# Patient Record
Sex: FEMALE | URBAN - METROPOLITAN AREA
[De-identification: names, ages, dates, MRNs, and addresses within clinical notes are randomized per-mention and may not be internally consistent; named-entity substitution may affect disease eponyms.]

---

## 2018-06-05 ENCOUNTER — THERAPY VISIT (OUTPATIENT)
Dept: PHYSICAL THERAPY | Facility: CLINIC | Age: 83
End: 2018-06-05
Payer: MEDICARE

## 2018-06-05 DIAGNOSIS — M25.562 ACUTE PAIN OF LEFT KNEE: Primary | ICD-10-CM

## 2018-06-05 PROCEDURE — 97162 PT EVAL MOD COMPLEX 30 MIN: CPT | Mod: GP | Performed by: PHYSICAL THERAPIST

## 2018-06-05 PROCEDURE — G8979 MOBILITY GOAL STATUS: HCPCS | Mod: GP | Performed by: PHYSICAL THERAPIST

## 2018-06-05 PROCEDURE — 97110 THERAPEUTIC EXERCISES: CPT | Mod: GP | Performed by: PHYSICAL THERAPIST

## 2018-06-05 PROCEDURE — G8978 MOBILITY CURRENT STATUS: HCPCS | Mod: GP | Performed by: PHYSICAL THERAPIST

## 2018-06-05 ASSESSMENT — ACTIVITIES OF DAILY LIVING (ADL)
RISE FROM A CHAIR: ACTIVITY IS FAIRLY DIFFICULT
AS_A_RESULT_OF_YOUR_KNEE_INJURY,_HOW_WOULD_YOU_RATE_YOUR_CURRENT_LEVEL_OF_DAILY_ACTIVITY?: NEARLY NORMAL
SWELLING: I HAVE THE SYMPTOM BUT IT DOES NOT AFFECT MY ACTIVITY
LIMPING: I HAVE THE SYMPTOM BUT IT DOES NOT AFFECT MY ACTIVITY
WEAKNESS: THE SYMPTOM AFFECTS MY ACTIVITY SLIGHTLY
SQUAT: ACTIVITY IS FAIRLY DIFFICULT
GO UP STAIRS: ACTIVITY IS VERY DIFFICULT
PAIN: THE SYMPTOM AFFECTS MY ACTIVITY MODERATELY
WALK: ACTIVITY IS SOMEWHAT DIFFICULT
STIFFNESS: THE SYMPTOM AFFECTS MY ACTIVITY MODERATELY
KNEE_ACTIVITY_OF_DAILY_LIVING_SUM: 39
KNEE_ACTIVITY_OF_DAILY_LIVING_SCORE: 55.71
GO DOWN STAIRS: ACTIVITY IS FAIRLY DIFFICULT
KNEEL ON THE FRONT OF YOUR KNEE: ACTIVITY IS VERY DIFFICULT
HOW_WOULD_YOU_RATE_THE_CURRENT_FUNCTION_OF_YOUR_KNEE_DURING_YOUR_USUAL_DAILY_ACTIVITIES_ON_A_SCALE_FROM_0_TO_100_WITH_100_BEING_YOUR_LEVEL_OF_KNEE_FUNCTION_PRIOR_TO_YOUR_INJURY_AND_0_BEING_THE_INABILITY_TO_PERFORM_ANY_OF_YOUR_USUAL_DAILY_ACTIVITIES?: 80
STAND: ACTIVITY IS SOMEWHAT DIFFICULT
GIVING WAY, BUCKLING OR SHIFTING OF KNEE: I DO NOT HAVE THE SYMPTOM
RAW_SCORE: 39
SIT WITH YOUR KNEE BENT: ACTIVITY IS NOT DIFFICULT
HOW_WOULD_YOU_RATE_THE_OVERALL_FUNCTION_OF_YOUR_KNEE_DURING_YOUR_USUAL_DAILY_ACTIVITIES?: NEARLY NORMAL

## 2018-06-05 NOTE — LETTER
DEPARTMENT OF HEALTH AND HUMAN SERVICES  CENTERS FOR MEDICARE & MEDICAID SERVICES    PLAN/UPDATED PLAN OF PROGRESS FOR OUTPATIENT REHABILITATION    PATIENTS NAME:  Caitie Law   : 1935  PROVIDER NUMBER:    [id number]  HICN: [id number]   PROVIDER NAME: [place] [facility] PHYSICAL THERAPY  MEDICAL RECORD NUMBER: [id number]   START OF CARE DATE:  SOC Date: 18   TYPE:  PT    PRIMARY/TREATMENT DIAGNOSIS: (Pertinent Medical Diagnosis)  Acute pain of left knee    VISITS FROM START OF CARE:  Rxs Used: 1     [place] [facility] Initial Evaluation  Subjective:  Patient is a 83 year old female presenting with rehab left knee hpi. The history is provided by the patient. No  was used.   Julia Law is a 83 year old female with a left knee condition.  Condition occurred with:  A twist.  Condition occurred: at home.  This is a new condition  Left knee pain that goes up into her hip and rib cage.  This started on 18.   She picked up an empty box and stood back up and twisted to the left and she felt like the left knee dislocated.  She was told she didn't have a dislocation, but a possible subluxation of the patella and even lateral meniscus tear.  She also has some OA of the knee.  Prior to this, she has had knee pain, but nothing like what she has now.  She had surgery on that knee many years ago for a meniscectomy.  She has also had L hip and rib cage pain for years, but the hip seems worse since .  The hip pain is present when lying in bed at night, after sitting too long and the first 1/4mile of her walks..    Patient reports pain:  Lateral and anterior (Also L latera hip, glutes and PSIS).  Radiates to:  Hip.  Pain is described as sharp and is intermittent Pain Scale: 2-6/10 knee, 7-8/10 hip.  Associated symptoms:  Loss of motion/stiffness. Pain is worse during the night.  Symptoms are exacerbated by bending/squatting (going up stairs,  getting out of chair/car.  Has to do stairs one at a time up and down and has to use hands to help up from chair. ) and relieved by NSAID's (pretzel stretch, trunk rotation and pelvic tilt so stretching helps).  Since onset symptoms are gradually improving.  Special tests:  X-ray. General health as reported by patient is excellent (But would like to walk a normal speed at the beginning of her walks, get back to walking 3 miles without sitting instead of just 1, to do stairs normally and be able to bike indoors and out.).                Pertinent medical history includes:  Menopausal.  Medical allergies: yes (Cortisone).  Other surgeries include:  Orthopedic surgery (Left knee arthrotomy 1968).  Medication history: Statin 10mg.  Current occupation is Retired  Primary job tasks include:  Driving, lifting and repetitive tasks (Computer work).  Objective:  Standing Alignment:    Lumbar:  Lordosis decr  Pelvic:  Normal  Hip:  Normal  Gait:    Gait Type:  Antalgic     Deviations:  Lumbar:  Trunk lean LHip:  Trendelenberg L and Trendelenberg RKnee:  Knee extension decr L  Knee Evaluation:  ROM:  Strength wnl knee: B hip abd 4/5, L hip er 4/5 and R 4+/5, B hip ext 5-/5.  AROM  Hyperextension:  Left:  0    Right: 0  Extension:  Left: 0    Right:  0  Flexion: Left: 138+    Right: 138  Strength:   Extension:  Left: 4+/5    Pain:+      Right: 5-/5    Pain:+  Flexion:  Left: 5/5   Pain:      Right: 5/5   Pain:    Quad Set Left:  Fair    Pain: +   Quad Set Right: Good    Pain:  Ligament Testing:  Normal  Special Tests:   Left knee positive for the following special tests:  Adebayo's  Right knee positive for the following tests:  Adebayo's  Palpation:  Palpation of knee: Patient also has an indent at middle of distal left quad.  Suspect a history of partial quad tear.  Left knee tenderness present at:  Medial Joint Line; IT Band; Patellar Lateral and Patellar Inferior  Left knee tenderness not present at:  Lateral Joint Line  Right knee  tenderness present at:  Medial Joint Line; IT Band; Patellar Lateral and Patellar Inferior  Right knee tenderness not present at:  Lateral Joint Line  Functional Testing:    Quad:    Single Leg Squat:  Left:       Right:        Bilateral Leg Squat:  40(pain in L knee)  Excessive anterior knee excursion    Proprioception:   Stork Balance Test:  Left:  6  Right:  8 opp hip drop  % of Uninvolved:   Assessment/Plan:    Patient is a 83 year old female with left side knee complaints.    Patient has the following significant findings with corresponding treatment plan.                Diagnosis 1:  L knee pain and OA  Pain -  self management, education and home program  Decreased ROM/flexibility - manual therapy, therapeutic exercise and home program  Decreased strength - therapeutic exercise, therapeutic activities and home program  Impaired balance - neuro re-education, therapeutic activities and home program  Impaired muscle performance - neuro re-education and home program  Decreased function - therapeutic activities and home program  Therapy Evaluation Codes:   1) History comprised of:   Personal factors that impact the plan of care:      None.    Comorbidity factors that impact the plan of care are:      Osteoarthritis.     Medications impacting care: None.  2) Examination of Body Systems comprised of:   Body structures and functions that impact the plan of care:      Hip, Knee and Lumbar spine.   Activity limitations that impact the plan of care are:      Sitting, Squatting/kneeling, Stairs, Walking and Sleeping.  3) Clinical presentation characteristics are:   Evolving/Changing.  4) Decision-Making    Moderate complexity using standardized patient assessment instrument and/or measureable assessment of functional outcome.  Cumulative Therapy Evaluation is: Moderate complexity.  Previous and current functional limitations:  (See Goal Flow Sheet for this information)    Short term and Long term goals: (See Goal Flow  "Sheet for this information)   Communication ability:  Patient appears to be able to clearly communicate and understand verbal and written communication and follow directions correctly.  Treatment Explanation - The following has been discussed with the patient:   RX ordered/plan of care  Anticipated outcomes  Possible risks and side effects  This patient would benefit from PT intervention to resume normal activities.   Rehab potential is good.    Frequency:  1 X week, once daily  Duration:  for 3 months  Discharge Plan:  Achieve all LTG.  Independent in home treatment program.  Reach maximal therapeutic benefit.    Caregiver Signature/Credentials _____________________________ Date ________       Treating Provider: rose marie Gant   I have reviewed and certified the need for these services and plan of treatment while under my care.        PHYSICIAN'S SIGNATURE:   _________________________________________  Date___________   Jose Hsieh MD    Certification period:  Beginning of Cert date period: 06/05/18 to  End of Cert period date: 09/02/18     Functional Level Progress Report: Please see attached \"Goal Flow sheet for Functional level.\"    ________ Continue Services or       ____X____ DC Services                Service dates: From  SOC Date: 06/05/18 date to present                         "

## 2018-06-05 NOTE — PROGRESS NOTES
[place] [facility] Initial Evaluation  Subjective:  Patient is a 83 year old female presenting with rehab left knee hpi. The history is provided by the patient. No  was used.   Julia Law is a 83 year old female with a left knee condition.  Condition occurred with:  A twist.  Condition occurred: at home.  This is a new condition  Left knee pain that goes up into her hip and rib cage.  This started on 5/19/18.   She picked up an empty box and stood back up and twisted to the left and she felt like the left knee dislocated.  She was told she didn't have a dislocation, but a possible subluxation of the patella and even lateral meniscus tear.  She also has some OA of the knee.    Prior to this, she has had knee pain, but nothing like what she has now.  She had surgery on that knee many years ago for a meniscectomy.  She has also had L hip and rib cage pain for years, but the hip seems worse since 5/19.  The hip pain is present when lying in bed at night, after sitting too long and the first 1/4mile of her walks..    Patient reports pain:  Lateral and anterior (Also L latera hip, glutes and PSIS).  Radiates to:  Hip.  Pain is described as sharp and is intermittent Pain Scale: 2-6/10 knee, 7-8/10 hip.  Associated symptoms:  Loss of motion/stiffness. Pain is worse during the night.  Symptoms are exacerbated by bending/squatting (going up stairs, getting out of chair/car.  Has to do stairs one at a time up and down and has to use hands to help up from chair. ) and relieved by NSAID's (pretzel stretch, trunk rotation and pelvic tilt so stretching helps).  Since onset symptoms are gradually improving.  Special tests:  X-ray.      General health as reported by patient is excellent (But would like to walk a normal speed at the beginning of her walks, get back to walking 3 miles without sitting instead of just 1, to do stairs normally and be able to bike indoors and out.).                                               Objective:  Standing Alignment:        Lumbar:  Lordosis decr  Pelvic:  Normal  Hip:  Normal        Gait:    Gait Type:  Antalgic     Deviations:  Lumbar:  Trunk lean LHip:  Trendelenberg L and Trendelenberg RKnee:  Knee extension decr L                                                      Knee Evaluation:  ROM:  Strength wnl knee: B hip abd 4/5, L hip er 4/5 and R 4+/5, B hip ext 5-/5.  AROM    Hyperextension:  Left:  0    Right: 0  Extension:  Left: 0    Right:  0  Flexion: Left: 138+    Right: 138        Strength:     Extension:  Left: 4+/5    Pain:+      Right: 5-/5    Pain:+  Flexion:  Left: 5/5   Pain:      Right: 5/5   Pain:    Quad Set Left:  Fair    Pain: +   Quad Set Right: Good    Pain:  Ligament Testing:  Normal                Special Tests:   Left knee positive for the following special tests:  Adebayo's  Right knee positive for the following tests:  Adebayo's  Palpation:  Palpation of knee: Patient also has an indent at middle of distal left quad.  Suspect a history of partial quad tear.  Left knee tenderness present at:  Medial Joint Line; IT Band; Patellar Lateral and Patellar Inferior  Left knee tenderness not present at:  Lateral Joint Line  Right knee tenderness present at:  Medial Joint Line; IT Band; Patellar Lateral and Patellar Inferior  Right knee tenderness not present at:  Lateral Joint Line      Functional Testing:          Quad:    Single Leg Squat:  Left:       Right:        Bilateral Leg Squat:  40(pain in L knee)  Excessive anterior knee excursion      Proprioception:   Stork Balance Test:  Left:  6  Right:  8 opp hip drop  % of Uninvolved:           General     ROS    Assessment/Plan:    Patient is a 83 year old female with left side knee complaints.    Patient has the following significant findings with corresponding treatment plan.                Diagnosis 1:  L knee pain and OA  Pain -  self management, education and home program  Decreased ROM/flexibility  - manual therapy, therapeutic exercise and home program  Decreased strength - therapeutic exercise, therapeutic activities and home program  Impaired balance - neuro re-education, therapeutic activities and home program  Impaired muscle performance - neuro re-education and home program  Decreased function - therapeutic activities and home program    Therapy Evaluation Codes:   1) History comprised of:   Personal factors that impact the plan of care:      None.    Comorbidity factors that impact the plan of care are:      Osteoarthritis.     Medications impacting care: None.  2) Examination of Body Systems comprised of:   Body structures and functions that impact the plan of care:      Hip, Knee and Lumbar spine.   Activity limitations that impact the plan of care are:      Sitting, Squatting/kneeling, Stairs, Walking and Sleeping.  3) Clinical presentation characteristics are:   Evolving/Changing.  4) Decision-Making    Moderate complexity using standardized patient assessment instrument and/or measureable assessment of functional outcome.  Cumulative Therapy Evaluation is: Moderate complexity.    Previous and current functional limitations:  (See Goal Flow Sheet for this information)    Short term and Long term goals: (See Goal Flow Sheet for this information)     Communication ability:  Patient appears to be able to clearly communicate and understand verbal and written communication and follow directions correctly.  Treatment Explanation - The following has been discussed with the patient:   RX ordered/plan of care  Anticipated outcomes  Possible risks and side effects  This patient would benefit from PT intervention to resume normal activities.   Rehab potential is good.    Frequency:  1 X week, once daily  Duration:  for 3 months  Discharge Plan:  Achieve all LTG.  Independent in home treatment program.  Reach maximal therapeutic benefit.    Please refer to the daily flowsheet for treatment today, total  treatment time and time spent performing 1:1 timed codes.

## 2018-06-05 NOTE — LETTER
[facility] ATHLETIC [facility] PHYSICAL [facility]  [address]  [place] [address] [phone]    2018    Re: Caitie Law   :   1935  MRN:  [id number]   REFERRING PHYSICIAN:   Jose Hsieh    [facility]    Date of Initial Evaluation:  18  Visits:  Rxs Used: 1  Reason for Referral:  Acute pain of left knee    EVALUATION SUMMARY    [facility]  Subjective:  The history is provided by the patient. No  was used.  Julia Law is a 83 year old female with a left knee condition.  Condition occurred with:  A twist.  Condition occurred: at home.  This is a new condition  Left knee pain that goes up into her hip and rib cage.  This started on 18.   She picked up an empty box and stood back up and twisted to the left and she felt like the left knee dislocated.  She was told she didn't have a dislocation, but a possible subluxation of the patella and even lateral meniscus tear.  She also has some OA of the knee.    Prior to this, she has had knee pain, but nothing like what she has now.  She had surgery on that knee many years ago for a meniscectomy.  She has also had L hip and rib cage pain for years, but the hip seems worse since .  The hip pain is present when lying in bed at night, after sitting too long and the first 1/4mile of her walks..    Patient reports pain:  Lateral and anterior (Also L latera hip, glutes and PSIS).  Radiates to:  Hip.  Pain is described as sharp and is intermittent Pain Scale: 2-6/10 knee, 7-8/10 hip.  Associated symptoms:  Loss of motion/stiffness. Pain is worse during the night.  Symptoms are exacerbated by bending/squatting (going up stairs, getting out of chair/car.  Has to do stairs one at a time up and down and has to use hands to help up from chair.) and relieved by NSAID's (pretzel stretch, trunk rotation and  pelvic tilt so stretching helps).  Since onset symptoms are gradually improving.  Special tests:  X-ray. General health as reported by patient is excellent (But would like to walk a normal speed at the beginning of her walks, get back to walking 3 miles without sitting instead of just 1, to do stairs normally and be able to bike indoors and out.)  Pertinent medical history includes:  Menopausal.  Medical allergies: yes (Cortisone).  Other surgeries include:  Orthopedic surgery (Left knee arthrotomy ).  Medication history: Statin 10mg.  Current occupation is Retired.  Primary job tasks include:  Driving, lifting and repetitive tasks (Computer work).                                     Re: Caitie Law   :   1935    Objective:  Standing Alignment:    Lumbar:  Lordosis decr  Pelvic:  Normal  Hip:  Normal  Gait:    Gait Type:  Antalgic     Deviations:  Lumbar:  Trunk lean LHip:  Trendelenberg L and Trendelenberg RKnee:  Knee extension decr L  Knee Evaluation:  ROM:  Strength wnl knee: B hip abd 4/5, L hip er 4/5 and R 4+/5, B hip ext 5-/5.  AROM  Hyperextension:  Left:  0    Right: 0  Extension:  Left: 0    Right:  0  Flexion: Left: 138+    Right: 138  Strength:   Extension:  Left: 4+/5    Pain:+      Right: 5-/5    Pain:+  Flexion:  Left: 5/5   Pain:      Right: 5/5   Pain:    Quad Set Left:  Fair    Pain: +   Quad Set Right: Good    Pain:  Ligament Testing:  Normal  Special Tests:   Left knee positive for the following special tests:  Adebayo's  Right knee positive for the following tests:  Adebayo's  Palpation:  Palpation of knee: Patient also has an indent at middle of distal left quad.  Suspect a history of partial quad tear.  Left knee tenderness present at:  Medial Joint Line; IT Band; Patellar Lateral and Patellar Inferior  Left knee tenderness not present at:  Lateral Joint Line  Right knee tenderness present at:  Medial Joint Line; IT Band; Patellar Lateral and Patellar Inferior  Right knee tenderness  not present at:  Lateral Joint Line  Functional Testing:    Quad:    Single Leg Squat:  Left:       Right:        Bilateral Leg Squat:  40(pain in L knee)  Excessive anterior knee excursion    Proprioception:   Stork Balance Test:  Left:  6  Right:  8 opp hip drop  % of Uninvolved:   General   ROS    Assessment/Plan:    Patient is a 83 year old female with left side knee complaints.    Patient has the following significant findings with corresponding treatment plan.                Diagnosis 1:  L knee pain and OA  Pain -  self management, education and home program        Re: Caitie Law   :   1935    Decreased ROM/flexibility - manual therapy, therapeutic exercise and home program  Decreased strength - therapeutic exercise, therapeutic activities and home program  Impaired balance - neuro re-education, therapeutic activities and home program  Impaired muscle performance - neuro re-education and home program  Decreased function - therapeutic activities and home program    Therapy Evaluation Codes:   1) History comprised of:   Personal factors that impact the plan of care:      None.    Comorbidity factors that impact the plan of care are:      Osteoarthritis.     Medications impacting care: None.  2) Examination of Body Systems comprised of:   Body structures and functions that impact the plan of care:      Hip, Knee and Lumbar spine.   Activity limitations that impact the plan of care are:      Sitting, Squatting/kneeling, Stairs, Walking and Sleeping.  3) Clinical presentation characteristics are:   Evolving/Changing.  4) Decision-Making    Moderate complexity using standardized patient assessment instrument and/or measureable assessment of functional outcome.  Cumulative Therapy Evaluation is: Moderate complexity.    Communication ability:  Patient appears to be able to clearly communicate and understand verbal and written communication and follow directions correctly.  Treatment Explanation - The  following has been discussed with the patient:   RX ordered/plan of care  Anticipated outcomes  Possible risks and side effects  This patient would benefit from PT intervention to resume normal activities.   Rehab potential is good.  Frequency:  1 X week, once daily  Duration:  for 2-3 months  Discharge Plan:  Achieve all LTG.  Independent in home treatment program.  Reach maximal therapeutic benefit.    Thank you for your referral.    INQUIRIES  Therapist: Betzy Bacon [facility]   [facility] PH[facility]  [address] [place] [address] [address]  [facility] [address]  Phone: [phone]  Fax: [phone]

## 2018-06-05 NOTE — MR AVS SNAPSHOT
After Visit Summary   6/5/2018    Caitie Law    MRN: [id number]           Patient Information     Date Of Birth          1935        Visit Information        Provider Department      6/5/2018 1:30 PM Betzy Bacon, PT [facility]        Today's Diagnoses     Acute pain of left knee    -  1       Follow-ups after your visit        Your next 10 appointments already scheduled     Jun 12, 2018 12:20 PM CDT   CORBY Extremity with Betzy Bacon PT   [facility] ([facility]    [address]  [facility] [address] [phone]            Jun 19, 2018 12:20 PM CDT   CORBY Extremity with Betzy Bacon PT   [facility] ([facility]    [address]  [facility] [id number] [phone]              Who to contact     If you have questions or need follow up information about today's clinic visit or your schedule please contact [facility] ATHLETIC [facility] PHYSICAL THERAPY directly at [phone].  Normal or non-critical lab and imaging results will be communicated to you by MyChart, letter or phone within 4 business days after the clinic has received the results. If you do not hear from us within 7 days, please contact the clinic through MyChart or phone. If you have a critical or abnormal lab result, we will notify you by phone as soon as possible.  Submit refill requests through [employer] or call your pharmacy and they will forward the refill request to us. Please allow 3 business days for your refill to be completed.          Additional Information About Your Visit        Care EveryWhere ID     This is your Care EveryWhere ID. This could be used by other organizations to access your [place] medical records  [id number]         Blood Pressure from Last 3 Encounters:   No data found for BP    Weight from Last 3  Encounters:   No data found for Wt              We Performed the Following     HC PT EVAL, MODERATE COMPLEXITY     CORBY INITIAL EVAL REPORT     THERAPEUTIC EXERCISES        Primary Care Provider Office Phone # Fax #    Jose Rafael Hseih [phone]       SP[facility]ED[facility] [address]  [facility] [address]        Equal Access to Services     [facility]: Hadii aad ku hadasho Soomaali, waaxda luqadaha, qaybta kaalmada adeegyada, waxay idiin hayaan adeeg kharash la'aan . So [facility] [phone].    ATENCIÓN: Si habla español, tiene a fair disposición servicios gratuitos de asistencia lingüística. Llame al [phone].    We comply with applicable federal civil rights laws and [place] laws. We do not discriminate on the basis of race, color, national origin, age, disability, sex, sexual orientation, or gender identity.            Thank you!     Thank you for choosing [facility] PH[facility]ICAL THERAP[facility]  for your care. Our goal is always to provide you with excellent care. Hearing back from our patients is one way we can continue to improve our services. Please take a few minutes to complete the written survey that you may receive in the mail after your visit with us. Thank you!             Your Updated Medication List - Protect others around you: Learn how to safely use, store and throw away your medicines at www.disposemymeds.org.      Notice  As of 6/5/2018  3:51 PM    You have not been prescribed any medications.

## 2018-06-06 NOTE — PROGRESS NOTES
[facility] Initial Evaluation  Subjective:  Patient is a 83 year old female presenting with rehab left knee hpi.                                      Pertinent medical history includes:  Menopausal.  Medical allergies: yes (Cortisone).  Other surgeries include:  Orthopedic surgery (Left knee arthrotomy 1968).  Medication history: Statin 10mg.  Current occupation is Retired  .    Primary job tasks include:  Driving, lifting and repetitive tasks (Computer work).                                Objective:  System    Physical Exam    General     ROS    Assessment/Plan:

## 2018-06-12 ENCOUNTER — THERAPY VISIT (OUTPATIENT)
Dept: PHYSICAL THERAPY | Facility: CLINIC | Age: 83
End: 2018-06-12
Payer: MEDICARE

## 2018-06-12 DIAGNOSIS — M25.562 ACUTE PAIN OF LEFT KNEE: ICD-10-CM

## 2018-06-12 PROCEDURE — 97110 THERAPEUTIC EXERCISES: CPT | Mod: GP | Performed by: PHYSICAL THERAPIST

## 2018-06-19 ENCOUNTER — THERAPY VISIT (OUTPATIENT)
Dept: PHYSICAL THERAPY | Facility: CLINIC | Age: 83
End: 2018-06-19
Payer: MEDICARE

## 2018-06-19 DIAGNOSIS — M25.562 ACUTE PAIN OF LEFT KNEE: ICD-10-CM

## 2018-06-19 PROCEDURE — G8980 MOBILITY D/C STATUS: HCPCS | Mod: GP | Performed by: PHYSICAL THERAPIST

## 2018-06-19 PROCEDURE — 97110 THERAPEUTIC EXERCISES: CPT | Mod: GP | Performed by: PHYSICAL THERAPIST

## 2018-06-19 PROCEDURE — G8979 MOBILITY GOAL STATUS: HCPCS | Mod: GP | Performed by: PHYSICAL THERAPIST

## 2018-06-19 PROCEDURE — 97530 THERAPEUTIC ACTIVITIES: CPT | Mod: GP | Performed by: PHYSICAL THERAPIST

## 2018-06-19 NOTE — LETTER
[facility]THLET[facility] PHYSICAL [facility]  [address]  [place] [id number] [phone]    2018    Re: Caitie Law   :   1935  MRN:  [id number]   REFERRING PHYSICIAN:   Jose Hsieh    [facility] PHYSICAL [facility]    Date of Initial Evaluation:    Visits:  Rxs Used: 3  Reason for Referral:  Acute pain of left knee    DISCHARGE REPORT  Progress reporting period is from 18 to 18.       SUBJECTIVE  Subjective changes noted by patient:  This patient was last seen at her third visit and she felt like she was better, but still had her aches and pains in the hip and knee.  The hip brought her up out of bed the previous night.  Has took notice that her L low back does get tight when walking.  She goes to [employer] and does weights and balance training 1x/wk.  ALso Does 90 min chi gong class.  Does yoga 1x/wk.      This patient then had to cancel all her follow ups, as she realized we were out of network for her insurance.  Current Pain level:  (1-6/10 L knee,7-8/10 L hip).     Initial Pain level:  (2-6/10L knee, 7-8/10 L hip).   Changes in function:  None  Adverse reaction to treatment or activity: None    OBJECTIVE  Changes noted in objective findings:      SOB after 2' on bike.    Back pain with prolonged standing/weight bearing and this may have something to do with her hip pain.   Further objective measures were not taken at this visit, as we did not know it would be her last.    Current objective measures unknown    ASSESSMENT/PLAN  Updated problem list and treatment plan: Diagnosis 1:  L knee pain and OA  Pain -  self management, education and home program  Decreased ROM/flexibility - manual therapy, therapeutic exercise and home program  Decreased strength - therapeutic exercise, therapeutic activities and home program  Impaired balance - neuro re-education, therapeutic activities and home program  Impaired muscle  performance - neuro re-education and home program  Decreased function - therapeutic activities and home program  STG/LTGs have been met or progress has been made towards goals:  None  Assessment of Progress: The patient has not returned to therapy due to insurance issues. Current status is unknown.  Re: Caitie Lwa   :   1935    Self Management Plans:  Patient is independent in a home treatment program.  Patient is independent in self management of symptoms.    Caitie continues to require the following intervention to meet STG and LTG's:  PT intervention is no longer required to meet STG/LTG.    Recommendations:  This patient is ready to be discharged from therapy and continue their home treatment program.    Thank you for your referral.    INQUIRIES  Therapist: Betzy Bacon [facility]   [facility]  [address] [place] [address] [id number]  [facility] [address]  Phone: [phone]  Fax: [phone]

## 2018-06-19 NOTE — MR AVS SNAPSHOT
After Visit Summary   6/19/2018    Caitie Law    MRN: [id number]           Patient Information     Date Of Birth          1935        Visit Information        Provider Department      6/19/2018 12:20 PM Betzy Bacon, PT [facility] - [facility]        Today's Diagnoses     Acute pain of left knee           Follow-ups after your visit        Your next 10 appointments already scheduled     Jul 10, 2018 12:20 PM CDT   [facility] Extremity with Betzy Bacon PT   [facility] ([facility] [place]  )    [address]  [place] [address] [phone]              Who to contact     If you have questions or need follow up information about today's clinic visit or your schedule please contact [facility] PH[facility]ERAP[facility] directly at [phone].  Normal or non-critical lab and imaging results will be communicated to you by MyChart, letter or phone within 4 business days after the clinic has received the results. If you do not hear from us within 7 days, please contact the clinic through MyChart or phone. If you have a critical or abnormal lab result, we will notify you by phone as soon as possible.  Submit refill requests through [employer] or call your pharmacy and they will forward the refill request to us. Please allow 3 business days for your refill to be completed.          Additional Information About Your Visit        Care EveryWhere ID     This is your Care EveryWhere ID. This could be used by other organizations to access your [facility] medical records  [id number]         Blood Pressure from Last 3 Encounters:   No data found for BP    Weight from Last 3 Encounters:   No data found for Wt              We Performed the Following     THERAPEUTIC ACTIVITIES     THERAPEUTIC EXERCISES        Primary Care Provider Office Phone # Fax #    Jose Hsieh [phone]        [facility] [address] [facility] [address]  [facility] [address]        Equal Access to Services     STUART ORDONEZ : Hadii jeremias Hamilton, jose dey, christie marsmaciera awan, waxay idiin haymarshahayden crowricoromeo escoto . So [facility] [phone].    ATENCIÓN: Si habla español, tiene a fair disposición servicios gratuitos de asistencia lingüística. Llame al [phone].    We comply with applicable federal civil rights laws and [place] laws. We do not discriminate on the basis of race, color, national origin, age, disability, sex, sexual orientation, or gender identity.            Thank you!     Thank you for choosing [facility]ERAP[facility]  for your care. Our goal is always to provide you with excellent care. Hearing back from our patients is one way we can continue to improve our services. Please take a few minutes to complete the written survey that you may receive in the mail after your visit with us. Thank you!             Your Updated Medication List - Protect others around you: Learn how to safely use, store and throw away your medicines at www.disposemymeds.org.      Notice  As of 6/19/2018  1:04 PM    You have not been prescribed any medications.

## 2018-07-14 PROBLEM — M25.562 ACUTE PAIN OF LEFT KNEE: Status: RESOLVED | Noted: 2018-06-05 | Resolved: 2018-07-14

## 2018-07-14 NOTE — PROGRESS NOTES
Subjective:  HPI                    Objective:  System    Physical Exam    General     ROS    Assessment/Plan:    DISCHARGE REPORT    Progress reporting period is from 6/5/18 to 6/19/18.       SUBJECTIVE  Subjective changes noted by patient:  This patient was last seen at her third visit and she felt like she was better, but still had her aches and pains in the hip and knee.  The hip brought her up out of bed the previous night.  Has took notice that her L low back does get tight when walking.    She goes to [employer] and does weights and balance training 1x/wk.  ALso Does 90 min chi gong class.  Does yoga 1x/wk.      This patient then had to cancel all her follow ups, as she realized we were out of network for her insurance.   Current Pain level:  (1-6/10 L knee,7-8/10 L hip).      Initial Pain level:  (2-6/10L knee, 7-8/10 L hip).   Changes in function:  None  Adverse reaction to treatment or activity: None    OBJECTIVE  Changes noted in objective findings:      SOB after 2' on bike.    Back pain with prolonged standing/weight bearing and this may have something to do with her hip pain.   Further objective measures were not taken at this visit, as we did not know it would be her last.    Current objective measures unknown    ASSESSMENT/PLAN  Updated problem list and treatment plan: Diagnosis 1:  L knee pain and OA  Pain -  self management, education and home program  Decreased ROM/flexibility - manual therapy, therapeutic exercise and home program  Decreased strength - therapeutic exercise, therapeutic activities and home program  Impaired balance - neuro re-education, therapeutic activities and home program  Impaired muscle performance - neuro re-education and home program  Decreased function - therapeutic activities and home program  STG/LTGs have been met or progress has been made towards goals:  None  Assessment of Progress: The patient has not returned to therapy due to insurance issues. Current status is  unknown.  Self Management Plans:  Patient is independent in a home treatment program.  Patient is independent in self management of symptoms.    Pat continues to require the following intervention to meet STG and LTG's:  PT intervention is no longer required to meet STG/LTG.    Recommendations:  This patient is ready to be discharged from therapy and continue their home treatment program.    Please refer to the daily flowsheet for treatment today, total treatment time and time spent performing 1:1 timed codes.